# Patient Record
Sex: MALE | Race: OTHER
[De-identification: names, ages, dates, MRNs, and addresses within clinical notes are randomized per-mention and may not be internally consistent; named-entity substitution may affect disease eponyms.]

---

## 2019-03-15 ENCOUNTER — HOSPITAL ENCOUNTER (EMERGENCY)
Dept: HOSPITAL 72 - EMR | Age: 64
Discharge: LEFT BEFORE DISCHARGE | End: 2019-03-15
Payer: COMMERCIAL

## 2019-03-15 VITALS — DIASTOLIC BLOOD PRESSURE: 70 MMHG | SYSTOLIC BLOOD PRESSURE: 101 MMHG

## 2019-03-15 VITALS — BODY MASS INDEX: 23.62 KG/M2 | HEIGHT: 70 IN | WEIGHT: 165 LBS

## 2019-03-15 VITALS — DIASTOLIC BLOOD PRESSURE: 67 MMHG | SYSTOLIC BLOOD PRESSURE: 124 MMHG

## 2019-03-15 DIAGNOSIS — Z86.73: ICD-10-CM

## 2019-03-15 DIAGNOSIS — R55: Primary | ICD-10-CM

## 2019-03-15 LAB
ADD MANUAL DIFF: YES
ALBUMIN SERPL-MCNC: 3.6 G/DL (ref 3.4–5)
ALBUMIN/GLOB SERPL: 1.1 {RATIO} (ref 1–2.7)
ALP SERPL-CCNC: 49 U/L (ref 46–116)
ALT SERPL-CCNC: 23 U/L (ref 12–78)
ANION GAP SERPL CALC-SCNC: 5 MMOL/L (ref 5–15)
APTT BLD: 26 SEC (ref 23–33)
AST SERPL-CCNC: 25 U/L (ref 15–37)
BILIRUB SERPL-MCNC: 0.4 MG/DL (ref 0.2–1)
BUN SERPL-MCNC: 23 MG/DL (ref 7–18)
CALCIUM SERPL-MCNC: 9 MG/DL (ref 8.5–10.1)
CHLORIDE SERPL-SCNC: 106 MMOL/L (ref 98–107)
CK MB SERPL-MCNC: 1.5 NG/ML (ref 0–3.6)
CK SERPL-CCNC: 140 U/L (ref 26–308)
CO2 SERPL-SCNC: 28 MMOL/L (ref 21–32)
CREAT SERPL-MCNC: 1.3 MG/DL (ref 0.55–1.3)
ERYTHROCYTE [DISTWIDTH] IN BLOOD BY AUTOMATED COUNT: 11.6 % (ref 11.6–14.8)
GLOBULIN SER-MCNC: 3.2 G/DL
HCT VFR BLD CALC: 44.3 % (ref 42–52)
HGB BLD-MCNC: 14.7 G/DL (ref 14.2–18)
INR PPP: 1 (ref 0.9–1.1)
MCV RBC AUTO: 92 FL (ref 80–99)
PLATELET # BLD: 127 K/UL (ref 150–450)
POTASSIUM SERPL-SCNC: 4.1 MMOL/L (ref 3.5–5.1)
RBC # BLD AUTO: 4.8 M/UL (ref 4.7–6.1)
SODIUM SERPL-SCNC: 139 MMOL/L (ref 136–145)
WBC # BLD AUTO: 5.3 K/UL (ref 4.8–10.8)

## 2019-03-15 PROCEDURE — 93005 ELECTROCARDIOGRAM TRACING: CPT

## 2019-03-15 PROCEDURE — 85730 THROMBOPLASTIN TIME PARTIAL: CPT

## 2019-03-15 PROCEDURE — 82553 CREATINE MB FRACTION: CPT

## 2019-03-15 PROCEDURE — 36415 COLL VENOUS BLD VENIPUNCTURE: CPT

## 2019-03-15 PROCEDURE — 99284 EMERGENCY DEPT VISIT MOD MDM: CPT

## 2019-03-15 PROCEDURE — 83880 ASSAY OF NATRIURETIC PEPTIDE: CPT

## 2019-03-15 PROCEDURE — 84484 ASSAY OF TROPONIN QUANT: CPT

## 2019-03-15 PROCEDURE — 85007 BL SMEAR W/DIFF WBC COUNT: CPT

## 2019-03-15 PROCEDURE — 82550 ASSAY OF CK (CPK): CPT

## 2019-03-15 PROCEDURE — 85025 COMPLETE CBC W/AUTO DIFF WBC: CPT

## 2019-03-15 PROCEDURE — 80053 COMPREHEN METABOLIC PANEL: CPT

## 2019-03-15 PROCEDURE — 85610 PROTHROMBIN TIME: CPT

## 2019-03-15 PROCEDURE — 71045 X-RAY EXAM CHEST 1 VIEW: CPT

## 2019-03-15 PROCEDURE — 70450 CT HEAD/BRAIN W/O DYE: CPT

## 2019-03-15 NOTE — NUR
ED Nurse Note:



Patient advised by Dr Bass to stay for admission. Pt signed AMA form. Pt is 
AAO x4, ambulatory. ID band/IV removed. Pt left wth his fiancee.

## 2019-03-15 NOTE — EMERGENCY ROOM REPORT
History of Present Illness


General


Chief Complaint:  Syncope


Source:  Patient, EMS





Present Illness


HPI


Patient presents with a syncopal episode he reports that he was standing in 

line he felt somewhat lightheaded and essentially lost consciousness





Soon after that the patient tried to stand up and again had a second episode





He had a recent URI symptom otherwise denies any recent neck pain or headache 

denies any chest pain denies any back or flank pain denies any focal weakness


Patient has had a previous CVA denies much residual


Allergies:  


Coded Allergies:  


     No Known Allergies (Unverified , 3/15/19)





Patient History


Past Medical History:  see triage record


Pertinent Family History:  none


Reviewed Nursing Documentation:  PMH: Agreed; PSxH: Agreed





Nursing Documentation-PMH


Past Medical History:  No History, Except For


Hx Cardiac Problems:  Yes - Triple bypass 2010


Hx Cerebrovascular Accident:  Yes - 1994





Review of Systems


All Other Systems:  negative except mentioned in HPI





Physical Exam





Vital Signs








  Date Time  Temp Pulse Resp B/P (MAP) Pulse Ox O2 Delivery O2 Flow Rate FiO2


 


3/15/19 13:02 97.5 49 16 113/59 96 Room Air  








Sp02 EP Interpretation:  reviewed, normal


General Appearance:  well appearing, no apparent distress


Head:  normocephalic, atraumatic


Eyes:  bilateral eye PERRL, bilateral eye EOMI


ENT:  hearing grossly normal, normal pharynx, TMs + canals normal, uvula midline


Neck:  full range of motion, supple, no meningismus, no bony tend


Respiratory:  lungs clear, normal breath sounds, no rhonchi, no respiratory 

distress, no retraction, no accessory muscle use


Cardiovascular #1:  normal peripheral pulses, regular rate, rhythm, no edema, 

no gallop, no JVD, no murmur


Gastrointestinal:  normal bowel sounds, non tender, soft, no mass, no 

organomegaly, non-distended, no guarding, no hernia, no pulsatile mass, no 

rebound


Genitourinary:  no CVA tenderness


Musculoskeletal:  normal inspection


Neurologic:  oriented x3, responsive, CNs III-XII nml as tested, motor strength/

tone normal, sensory intact


Psychiatric:  mood/affect normal


Skin:  normal color, no rash, warm/dry, palpation normal


Lymphatic:  normal inspection, no adenopathy





Medical Decision Making


Diagnostic Impression:  


 Primary Impression:  


 Syncope


ER Course


Patient is a fairly complex patient with multiple differential to consideration 

including but not limited to cardiac cardiopulmonary and vascular emergencies





Patient's CT revealing previous CVA


Patient confirms this after discussion





At this time given the patient's hypotensive episodes given the syncopal 

episode and his risk factors patient does require further inpatient care 

however he is refusing admission patient has full decision-making capacity 

understands the risk factors was provided copy of his blood work and EKG and 

reports that he will follow closely





Labs








Test


  3/15/19


13:23


 


White Blood Count


  5.3 K/UL


(4.8-10.8)


 


Red Blood Count


  4.80 M/UL


(4.70-6.10)


 


Hemoglobin


  14.7 G/DL


(14.2-18.0)


 


Hematocrit


  44.3 %


(42.0-52.0)


 


Mean Corpuscular Volume 92 FL (80-99) 


 


Mean Corpuscular Hemoglobin


  30.7 PG


(27.0-31.0)


 


Mean Corpuscular Hemoglobin


Concent 33.2 G/DL


(32.0-36.0)


 


Red Cell Distribution Width


  11.6 %


(11.6-14.8)


 


Platelet Count


  127 K/UL


(150-450)


 


Mean Platelet Volume


  12.4 FL


(6.5-10.1)


 


Neutrophils (%) (Auto)  % (45.0-75.0) 


 


Lymphocytes (%) (Auto)  % (20.0-45.0) 


 


Monocytes (%) (Auto)  % (1.0-10.0) 


 


Eosinophils (%) (Auto)  % (0.0-3.0) 


 


Basophils (%) (Auto)  % (0.0-2.0) 


 


Differential Total Cells


Counted 100 


 


 


Neutrophils % (Manual) 65 % (45-75) 


 


Lymphocytes % (Manual) 22 % (20-45) 


 


Monocytes % (Manual) 12 % (1-10) 


 


Eosinophils % (Manual) 1 % (0-3) 


 


Basophils % (Manual) 0 % (0-2) 


 


Band Neutrophils 0 % (0-8) 


 


Platelet Estimate Decreased 


 


Platelet Morphology Normal 


 


Red Blood Cell Morphology Normal 


 


Prothrombin Time


  10.6 SEC


(9.30-11.50)


 


Prothromb Time International


Ratio 1.0 (0.9-1.1) 


 


 


Activated Partial


Thromboplast Time 26 SEC (23-33) 


 


 


Sodium Level


  139 MMOL/L


(136-145)


 


Potassium Level


  4.1 MMOL/L


(3.5-5.1)


 


Chloride Level


  106 MMOL/L


()


 


Carbon Dioxide Level


  28 MMOL/L


(21-32)


 


Anion Gap


  5 mmol/L


(5-15)


 


Blood Urea Nitrogen


  23 mg/dL


(7-18)


 


Creatinine


  1.3 MG/DL


(0.55-1.30)


 


Estimat Glomerular Filtration


Rate 55.9 mL/min


(>60)


 


Glucose Level


  118 MG/DL


()


 


Calcium Level


  9.0 MG/DL


(8.5-10.1)


 


Total Bilirubin


  0.4 MG/DL


(0.2-1.0)


 


Aspartate Amino Transf


(AST/SGOT) 25 U/L (15-37) 


 


 


Alanine Aminotransferase


(ALT/SGPT) 23 U/L (12-78) 


 


 


Alkaline Phosphatase


  49 U/L


()


 


Total Creatine Kinase


  140 U/L


()


 


Creatine Kinase MB


  1.5 NG/ML


(0.0-3.6)


 


Creatine Kinase MB Relative


Index 1.0 


 


 


Troponin I


  0.017 ng/mL


(0.000-0.056)


 


Pro-B-Type Natriuretic Peptide


  60 pg/mL


(0-125)


 


Total Protein


  6.8 G/DL


(6.4-8.2)


 


Albumin


  3.6 G/DL


(3.4-5.0)


 


Globulin 3.2 g/dL 


 


Albumin/Globulin Ratio 1.1 (1.0-2.7) 








EKG Diagnostic Results


Rate:  normal


Rhythm:  NSR


ST Segments:  other - Nonspecific ST T-wave changes





Rhythm Strip Diag. Results


EP Interpretation:  yes


Rate:  60


Rhythm:  NSR, no PVC's, no ectopy





CT/MRI/US Diagnostic Results


CT/MRI/US Diagnostic Results :  


   Impression


CT headImpression: Negative for acute intracranial bleed or mass effect


 


Age indeterminate although probably old lacunar infarct in the left internal 

capsule.


MRI may use be useful to clarify acuity if clinically indicated


 


Multiple old infarcts as noted


 


Other chronic and age-related changes, as described





Last Vital Signs








  Date Time  Temp Pulse Resp B/P (MAP) Pulse Ox O2 Delivery O2 Flow Rate FiO2


 


3/15/19 13:23 97.5 50 21 124/67 97 Room Air  








Status:  improved


Disposition:  AGAINST MEDICAL ADVICE


Condition:  Improved











Hermelinda Bass DO Mar 15, 2019 14:52

## 2019-03-15 NOTE — NUR
ED Nurse Note:



[place] 2 episodes of syncope. Per 
pt, he felt dizzy, fell down and lost consciousness while standing in line [place] 
the cour[place]. When pt gained consciousnes, and tried to get up, he fell and 
lost consciousness again. Per, pt's Fiancee pt hit his head. Pt is AAO x4, 
ambulatory with non labored breathing. Follows commands. No bumps or open 
wounds in the head.

## 2019-03-15 NOTE — DIAGNOSTIC IMAGING REPORT
Indication: Chest pain

 

Technique: One view of the chest

 

Comparison: none

 

Findings: The lungs and pleural spaces are clear. There is evidence of prior CABG

 

Impression: No acute process

## 2019-03-15 NOTE — DIAGNOSTIC IMAGING REPORT
Indications: Syncope

 

Technique: Spiral acquisitions obtained through the brain. Angled axial and coronal 5

x 5 mm slices were reconstructed. Total dose length product 1319.78 mGycm.  CTDI

vol(s) 70.38 mGy. Dose reduction achieved using automated exposure control

 

Comparison: None.

 

Findings: There is age-related enlargement of the ventricles and extra axial CSF

spaces. There is periventricular deep white matter low-attenuation. There is a

sizable old lacunar infarct extending from the left corona radiata into the left

basal ganglia. There is an age-indeterminate lacunar infarct involving the anterior

limb of the left internal capsule. Small old lacunar infarct is seen in the right

frontal deep white matter. No acute intracranial hemorrhage. No mass effect nor

midline shift. Otherwise normal gray-white differentiation. There are dense middle

cerebral artery calcifications and vertebral artery calcifications. The calvarium is

intact. Visualized orbits and sinuses are unremarkable. The mastoids are clear.

 

Impression: Negative for acute intracranial bleed or mass effect

 

Age indeterminate although probably old lacunar infarct in the left internal capsule.

MRI may use be useful to clarify acuity if clinically indicated

 

Multiple old infarcts as noted

 

Other chronic and age-related changes, as described

 

 

 

The CT scanner at [facility] is accredited by the [employer]

Radiology and the scans are performed using protocols designed to limit radiation

exposure to as low as reasonably achievable to attain images of sufficient resolution

adequate for diagnostic evaluation.